# Patient Record
Sex: FEMALE | Race: BLACK OR AFRICAN AMERICAN | NOT HISPANIC OR LATINO | Employment: OTHER | URBAN - METROPOLITAN AREA
[De-identification: names, ages, dates, MRNs, and addresses within clinical notes are randomized per-mention and may not be internally consistent; named-entity substitution may affect disease eponyms.]

---

## 2022-04-07 ENCOUNTER — HISTORICAL (OUTPATIENT)
Dept: ADMINISTRATIVE | Facility: HOSPITAL | Age: 77
End: 2022-04-07
Payer: MEDICARE

## 2022-04-24 VITALS
BODY MASS INDEX: 25.58 KG/M2 | WEIGHT: 163 LBS | SYSTOLIC BLOOD PRESSURE: 130 MMHG | HEIGHT: 67 IN | DIASTOLIC BLOOD PRESSURE: 80 MMHG

## 2022-05-05 RX ORDER — CLOPIDOGREL BISULFATE 75 MG/1
1 TABLET ORAL DAILY
COMMUNITY
Start: 2022-02-18 | End: 2022-05-05 | Stop reason: SDUPTHER

## 2022-05-05 RX ORDER — CLOPIDOGREL BISULFATE 75 MG/1
75 TABLET ORAL DAILY
Qty: 90 TABLET | Refills: 2 | Status: SHIPPED | OUTPATIENT
Start: 2022-05-05 | End: 2022-05-06 | Stop reason: SDUPTHER

## 2022-05-06 DIAGNOSIS — Z86.73 HISTORY OF STROKE: Primary | ICD-10-CM

## 2022-05-06 RX ORDER — CLOPIDOGREL BISULFATE 75 MG/1
75 TABLET ORAL DAILY
Qty: 90 TABLET | Refills: 2 | Status: SHIPPED | OUTPATIENT
Start: 2022-05-06 | End: 2022-12-21

## 2022-05-23 RX ORDER — HUMAN INSULIN 100 [USP'U]/ML
INJECTION, SUSPENSION SUBCUTANEOUS
COMMUNITY
Start: 2021-12-07

## 2022-05-23 RX ORDER — BETAMETHASONE DIPROPIONATE 0.5 MG/G
CREAM TOPICAL
COMMUNITY
Start: 2022-02-24

## 2022-05-23 RX ORDER — METOPROLOL SUCCINATE 50 MG/1
TABLET, EXTENDED RELEASE ORAL
COMMUNITY
Start: 2022-05-04

## 2022-05-23 RX ORDER — LAMOTRIGINE 150 MG/1
1 TABLET ORAL DAILY
COMMUNITY
Start: 2022-05-03 | End: 2022-10-26 | Stop reason: SDUPTHER

## 2022-05-23 RX ORDER — CLOBETASOL PROPIONATE 0.5 MG/G
CREAM TOPICAL
COMMUNITY
Start: 2021-11-30

## 2022-05-23 RX ORDER — ZOLPIDEM TARTRATE 5 MG/1
TABLET ORAL
COMMUNITY
Start: 2022-05-04

## 2022-05-23 RX ORDER — ATORVASTATIN CALCIUM 10 MG/1
TABLET, FILM COATED ORAL
COMMUNITY
Start: 2022-04-09

## 2022-05-23 RX ORDER — METOCLOPRAMIDE 5 MG/1
TABLET ORAL
COMMUNITY
Start: 2022-05-03

## 2022-05-23 RX ORDER — SERTRALINE HYDROCHLORIDE 25 MG/1
TABLET, FILM COATED ORAL
COMMUNITY
Start: 2022-02-25

## 2022-05-23 RX ORDER — RAMIPRIL 10 MG/1
CAPSULE ORAL
COMMUNITY
Start: 2022-02-18

## 2022-05-23 RX ORDER — FUROSEMIDE 20 MG/1
TABLET ORAL
COMMUNITY
Start: 2022-05-03

## 2022-05-24 ENCOUNTER — OFFICE VISIT (OUTPATIENT)
Dept: NEUROLOGY | Facility: CLINIC | Age: 77
End: 2022-05-24
Payer: MEDICARE

## 2022-05-24 VITALS
WEIGHT: 164 LBS | HEIGHT: 67 IN | BODY MASS INDEX: 25.74 KG/M2 | DIASTOLIC BLOOD PRESSURE: 66 MMHG | SYSTOLIC BLOOD PRESSURE: 120 MMHG

## 2022-05-24 DIAGNOSIS — G47.33 OSA ON CPAP: Primary | ICD-10-CM

## 2022-05-24 DIAGNOSIS — G45.9 TIA (TRANSIENT ISCHEMIC ATTACK): ICD-10-CM

## 2022-05-24 DIAGNOSIS — G40.209 PARTIAL SYMPTOMATIC EPILEPSY WITH COMPLEX PARTIAL SEIZURES, NOT INTRACTABLE, WITHOUT STATUS EPILEPTICUS: ICD-10-CM

## 2022-05-24 PROBLEM — Z86.73 H/O: STROKE: Status: ACTIVE | Noted: 2022-05-24

## 2022-05-24 PROBLEM — Z86.73 H/O: STROKE: Status: RESOLVED | Noted: 2022-05-24 | Resolved: 2022-05-24

## 2022-05-24 PROBLEM — G40.909 NONINTRACTABLE EPILEPSY WITHOUT STATUS EPILEPTICUS: Status: ACTIVE | Noted: 2022-05-24

## 2022-05-24 PROCEDURE — 99213 OFFICE O/P EST LOW 20 MIN: CPT | Mod: S$GLB,,, | Performed by: NURSE PRACTITIONER

## 2022-05-24 PROCEDURE — 3078F PR MOST RECENT DIASTOLIC BLOOD PRESSURE < 80 MM HG: ICD-10-PCS | Mod: CPTII,S$GLB,, | Performed by: NURSE PRACTITIONER

## 2022-05-24 PROCEDURE — 99213 PR OFFICE/OUTPT VISIT, EST, LEVL III, 20-29 MIN: ICD-10-PCS | Mod: S$GLB,,, | Performed by: NURSE PRACTITIONER

## 2022-05-24 PROCEDURE — 3288F FALL RISK ASSESSMENT DOCD: CPT | Mod: CPTII,S$GLB,, | Performed by: NURSE PRACTITIONER

## 2022-05-24 PROCEDURE — 99999 PR PBB SHADOW E&M-EST. PATIENT-LVL III: ICD-10-PCS | Mod: PBBFAC,,, | Performed by: NURSE PRACTITIONER

## 2022-05-24 PROCEDURE — 1101F PR PT FALLS ASSESS DOC 0-1 FALLS W/OUT INJ PAST YR: ICD-10-PCS | Mod: CPTII,S$GLB,, | Performed by: NURSE PRACTITIONER

## 2022-05-24 PROCEDURE — 3078F DIAST BP <80 MM HG: CPT | Mod: CPTII,S$GLB,, | Performed by: NURSE PRACTITIONER

## 2022-05-24 PROCEDURE — 1101F PT FALLS ASSESS-DOCD LE1/YR: CPT | Mod: CPTII,S$GLB,, | Performed by: NURSE PRACTITIONER

## 2022-05-24 PROCEDURE — 3288F PR FALLS RISK ASSESSMENT DOCUMENTED: ICD-10-PCS | Mod: CPTII,S$GLB,, | Performed by: NURSE PRACTITIONER

## 2022-05-24 PROCEDURE — 1160F PR REVIEW ALL MEDS BY PRESCRIBER/CLIN PHARMACIST DOCUMENTED: ICD-10-PCS | Mod: CPTII,S$GLB,, | Performed by: NURSE PRACTITIONER

## 2022-05-24 PROCEDURE — 1159F MED LIST DOCD IN RCRD: CPT | Mod: CPTII,S$GLB,, | Performed by: NURSE PRACTITIONER

## 2022-05-24 PROCEDURE — 3074F PR MOST RECENT SYSTOLIC BLOOD PRESSURE < 130 MM HG: ICD-10-PCS | Mod: CPTII,S$GLB,, | Performed by: NURSE PRACTITIONER

## 2022-05-24 PROCEDURE — 3074F SYST BP LT 130 MM HG: CPT | Mod: CPTII,S$GLB,, | Performed by: NURSE PRACTITIONER

## 2022-05-24 PROCEDURE — 99999 PR PBB SHADOW E&M-EST. PATIENT-LVL III: CPT | Mod: PBBFAC,,, | Performed by: NURSE PRACTITIONER

## 2022-05-24 PROCEDURE — 1160F RVW MEDS BY RX/DR IN RCRD: CPT | Mod: CPTII,S$GLB,, | Performed by: NURSE PRACTITIONER

## 2022-05-24 PROCEDURE — 1159F PR MEDICATION LIST DOCUMENTED IN MEDICAL RECORD: ICD-10-PCS | Mod: CPTII,S$GLB,, | Performed by: NURSE PRACTITIONER

## 2022-05-24 RX ORDER — AMOXICILLIN 500 MG
1 CAPSULE ORAL
COMMUNITY

## 2022-05-24 RX ORDER — CHOLECALCIFEROL (VITAMIN D3) 25 MCG
1000 TABLET ORAL
COMMUNITY

## 2022-05-24 RX ORDER — GABAPENTIN 300 MG/1
300 CAPSULE ORAL NIGHTLY
COMMUNITY

## 2022-05-24 NOTE — PROGRESS NOTES
Subjective:       Patient ID: Sheila West is a 77 y.o. female.    Chief Complaint: 1 yr francisco j f/u/sz/CVA    HPI:            Pt reports nightly usage of pap machine; pap therapy beneficial for sleep. Refreshed awakenings, occ eds. Tolerating mask/pressure well. Needs supply order; dme is [facility].    PAP data:  Date range: 4/23/22-5/22/22  % of usage >= 4 hrs: 50% (15/30)  AHI: 0.7    EPWORTH SLEEPINESS SCALE 5/24/2022   Sitting and reading 1   Watching TV 2   Sitting, inactive in a public place (e.g. a theatre or a meeting) 0   As a passenger in a car for an hour without a break 0   Lying down to rest in the afternoon when circumstances permit 3   Sitting and talking to someone 0   Sitting quietly after a lunch without alcohol 0   In a car, while stopped for a few minutes in traffic 0   Total score 6     Denies sz   mg qhs    TIA  Takes Plavix 75 mg daily, atorvastatin, antihypertensives  Denies CVA symptoms    Labs:  LDL was 99  A1C 7.1  CMP was ok       Review of Systems   Constitutional: Negative for fatigue.   HENT: Positive for nasal congestion and sinus pressure/congestion. Negative for trouble swallowing.    Musculoskeletal: Positive for back pain and gait problem.   Neurological: Negative for seizures, facial asymmetry, speech difficulty, weakness, disturbances in coordination and coordination difficulties.   Psychiatric/Behavioral: Negative for sleep disturbance.             Social History     Socioeconomic History    Marital status:    Tobacco Use    Smoking status: Former Smoker    Smokeless tobacco: Never Used   Substance and Sexual Activity    Alcohol use: Never    Drug use: Never         Current Outpatient Medications:     atorvastatin (LIPITOR) 10 MG tablet, , Disp: , Rfl:     betamethasone dipropionate 0.05 % cream, , Disp: , Rfl:     clobetasoL (TEMOVATE) 0.05 % cream, , Disp: , Rfl:     clopidogreL (PLAVIX) 75 mg tablet, Take 1 tablet (75 mg total) by mouth once daily at  "6am., Disp: 90 tablet, Rfl: 2    furosemide (LASIX) 20 MG tablet, , Disp: , Rfl:     lamoTRIgine (LAMICTAL) 150 MG Tab, Take 1 tablet by mouth once daily at 6am., Disp: , Rfl:     metoclopramide HCl (REGLAN) 5 MG tablet, , Disp: , Rfl:     metoprolol succinate (TOPROL-XL) 50 MG 24 hr tablet, , Disp: , Rfl:     NOVOLIN 70/30 U-100 INSULIN 100 unit/mL (70-30) injection, , Disp: , Rfl:     omega-3 fatty acids/fish oil (FISH OIL-OMEGA-3 FATTY ACIDS) 300-1,000 mg capsule, Take 1 g by mouth., Disp: , Rfl:     ramipriL (ALTACE) 10 MG capsule, , Disp: , Rfl:     sertraline (ZOLOFT) 25 MG tablet, , Disp: , Rfl:     vitamin D (VITAMIN D3) 1000 units Tab, Take 1,000 Units by mouth., Disp: , Rfl:     zolpidem (AMBIEN) 5 MG Tab, , Disp: , Rfl:     gabapentin (NEURONTIN) 300 MG capsule, Take 300 mg by mouth every evening., Disp: , Rfl:      Objective:        Exam:   /66 (BP Location: Left arm, Patient Position: Sitting)   Ht 5' 7" (1.702 m)   Wt 74.4 kg (164 lb)   BMI 25.69 kg/m²     Physical Exam  Vitals reviewed.   Constitutional:       Appearance: Normal appearance.      Comments: Accompanied by    HENT:      Ears:      Comments: Hearing normal   Eyes:      Extraocular Movements: Extraocular movements intact.   Cardiovascular:      Rate and Rhythm: Normal rate and regular rhythm.   Pulmonary:      Effort: Pulmonary effort is normal.      Breath sounds: Normal breath sounds.   Musculoskeletal:         General: Normal range of motion.   Skin:     General: Skin is warm and dry.   Neurological:      General: No focal deficit present.      Mental Status: She is alert and oriented to person, place, and time.      Motor: No tremor.      Coordination: Coordination is intact.      Gait: Gait abnormal.      Comments:   Limp 2.2 one leg shorter than other; cane but did not use it; turn en bloc   Psychiatric:         Mood and Affect: Mood normal.         Behavior: Behavior normal.                 "   Assessment/Plan:       Problem List Items Addressed This Visit        Neuro    Nonintractable epilepsy without status epilepticus    Current Assessment & Plan     Continue the  mg at bed time           TIA (transient ischemic attack)    Current Assessment & Plan     Continue 2' stroke prevention measures                Other    IFEANYI on CPAP - Primary    Current Assessment & Plan     Encouraged continued use of PAP. Drowsy driving may still occur despite PAP use. Clinical follow up and replacement of supplies discussed.    Strongly encouraged her to utilize PAP for 4 hours or more > 4 hrs 70% of nights. Ask that she reach out to PCP for further guidance in regards to her chr sinusitis in hopes that treatment may help to make PAP therapy more tolerable.     DME:[facility] in 1 year                            Vish Moran, MSN, APRN, AGACNP-BC

## 2022-05-24 NOTE — ASSESSMENT & PLAN NOTE
Encouraged continued use of PAP. Drowsy driving may still occur despite PAP use. Clinical follow up and replacement of supplies discussed.    Strongly encouraged her to utilize PAP for 4 hours or more > 4 hrs 70% of nights. Ask that she reach out to PCP for further guidance in regards to her chr sinusitis in hopes that treatment may help to make PAP therapy more tolerable.     DME:[facility] in 1 year

## 2022-08-03 ENCOUNTER — TELEPHONE (OUTPATIENT)
Dept: NEUROLOGY | Facility: CLINIC | Age: 77
End: 2022-08-03
Payer: MEDICARE

## 2022-08-03 NOTE — TELEPHONE ENCOUNTER
Pt called that she is having a procedure with Dr. Garvey on 8/16/22    She is on plavix and asking how long she should be off prior to sx  ..  (need to call pt, and also need to fax info to Dr. Garvey)

## 2022-10-26 DIAGNOSIS — G40.209 PARTIAL SYMPTOMATIC EPILEPSY WITH COMPLEX PARTIAL SEIZURES, NOT INTRACTABLE, WITHOUT STATUS EPILEPTICUS: Primary | ICD-10-CM

## 2022-10-26 RX ORDER — LAMOTRIGINE 150 MG/1
150 TABLET ORAL DAILY
Qty: 90 TABLET | Refills: 3 | Status: SHIPPED | OUTPATIENT
Start: 2022-10-26 | End: 2023-05-30 | Stop reason: SDUPTHER

## 2022-12-21 DIAGNOSIS — Z86.73 HISTORY OF STROKE: ICD-10-CM

## 2022-12-21 RX ORDER — CLOPIDOGREL BISULFATE 75 MG/1
TABLET ORAL
Qty: 90 TABLET | Refills: 2 | Status: SHIPPED | OUTPATIENT
Start: 2022-12-21 | End: 2024-01-16

## 2023-05-30 ENCOUNTER — OFFICE VISIT (OUTPATIENT)
Dept: NEUROLOGY | Facility: CLINIC | Age: 78
End: 2023-05-30
Payer: MEDICARE

## 2023-05-30 VITALS
WEIGHT: 164 LBS | DIASTOLIC BLOOD PRESSURE: 76 MMHG | BODY MASS INDEX: 25.74 KG/M2 | HEIGHT: 67 IN | SYSTOLIC BLOOD PRESSURE: 140 MMHG

## 2023-05-30 DIAGNOSIS — G40.209 PARTIAL SYMPTOMATIC EPILEPSY WITH COMPLEX PARTIAL SEIZURES, NOT INTRACTABLE, WITHOUT STATUS EPILEPTICUS: ICD-10-CM

## 2023-05-30 DIAGNOSIS — G47.33 OSA ON CPAP: Primary | ICD-10-CM

## 2023-05-30 DIAGNOSIS — R42 DIZZINESS: ICD-10-CM

## 2023-05-30 PROCEDURE — 3078F PR MOST RECENT DIASTOLIC BLOOD PRESSURE < 80 MM HG: ICD-10-PCS | Mod: CPTII,S$GLB,, | Performed by: NURSE PRACTITIONER

## 2023-05-30 PROCEDURE — 1160F RVW MEDS BY RX/DR IN RCRD: CPT | Mod: CPTII,S$GLB,, | Performed by: NURSE PRACTITIONER

## 2023-05-30 PROCEDURE — 3078F DIAST BP <80 MM HG: CPT | Mod: CPTII,S$GLB,, | Performed by: NURSE PRACTITIONER

## 2023-05-30 PROCEDURE — 99999 PR PBB SHADOW E&M-EST. PATIENT-LVL III: CPT | Mod: PBBFAC,,, | Performed by: NURSE PRACTITIONER

## 2023-05-30 PROCEDURE — 99999 PR PBB SHADOW E&M-EST. PATIENT-LVL III: ICD-10-PCS | Mod: PBBFAC,,, | Performed by: NURSE PRACTITIONER

## 2023-05-30 PROCEDURE — 3077F PR MOST RECENT SYSTOLIC BLOOD PRESSURE >= 140 MM HG: ICD-10-PCS | Mod: CPTII,S$GLB,, | Performed by: NURSE PRACTITIONER

## 2023-05-30 PROCEDURE — 1160F PR REVIEW ALL MEDS BY PRESCRIBER/CLIN PHARMACIST DOCUMENTED: ICD-10-PCS | Mod: CPTII,S$GLB,, | Performed by: NURSE PRACTITIONER

## 2023-05-30 PROCEDURE — 99214 PR OFFICE/OUTPT VISIT, EST, LEVL IV, 30-39 MIN: ICD-10-PCS | Mod: S$GLB,,, | Performed by: NURSE PRACTITIONER

## 2023-05-30 PROCEDURE — 3077F SYST BP >= 140 MM HG: CPT | Mod: CPTII,S$GLB,, | Performed by: NURSE PRACTITIONER

## 2023-05-30 PROCEDURE — 99214 OFFICE O/P EST MOD 30 MIN: CPT | Mod: S$GLB,,, | Performed by: NURSE PRACTITIONER

## 2023-05-30 PROCEDURE — 1159F MED LIST DOCD IN RCRD: CPT | Mod: CPTII,S$GLB,, | Performed by: NURSE PRACTITIONER

## 2023-05-30 PROCEDURE — 1159F PR MEDICATION LIST DOCUMENTED IN MEDICAL RECORD: ICD-10-PCS | Mod: CPTII,S$GLB,, | Performed by: NURSE PRACTITIONER

## 2023-05-30 RX ORDER — PANTOPRAZOLE SODIUM 40 MG/1
TABLET, DELAYED RELEASE ORAL
COMMUNITY
Start: 2023-04-20

## 2023-05-30 RX ORDER — LAMOTRIGINE 150 MG/1
150 TABLET ORAL DAILY
Qty: 90 TABLET | Refills: 3 | Status: SHIPPED | OUTPATIENT
Start: 2023-05-30

## 2023-05-30 RX ORDER — LATANOPROST 50 UG/ML
SOLUTION/ DROPS OPHTHALMIC
COMMUNITY
Start: 2023-02-21

## 2023-05-30 NOTE — PROGRESS NOTES
"  Neurology Follow up Note    Subjective:         Patient ID: Sheila West is a 78 y.o. female.    Chief Complaint: follow up for IFEANYI and sz    HPI:            IFEANYI:  Staes she has been wearing the CPAP abt 3-4 times per night due being unable to breathe. Sleeps better w CPAP when she is felling good. Sleeps 5-6 hrs a nigjht and awakens 1 time due to nocturia and is able to go right back to sleep.  Awakens refreshed and occs has EDS. Does not nap. Changes mask and tubing on a regular basis. Denies any issues w the mask or the equipment. DME-[facility].     PAP data:  date range 04/29/23-05/28/23  days used >=4hr 16/30 53%  AHI 0.8  Autopap 5-20 cm     Sz:  Remains sz free   mg at bed time    she is having an Angioplasty done on 06/23/2023 per Dr. Horvath    She has been struggling with dizziness since "1980's". She has been to PT numerous times. She recently visited with Dr. Garvey [neurosurgery] - per patient, was told her "balance problems are 2.2 her bad back"        EPWORTH SLEEPINESS SCALE 5/24/2022   Sitting and reading 1   Watching TV 2   Sitting, inactive in a public place (e.g. a theatre or a meeting) 0   As a passenger in a car for an hour without a break 0   Lying down to rest in the afternoon when circumstances permit 3   Sitting and talking to someone 0   Sitting quietly after a lunch without alcohol 0   In a car, while stopped for a few minutes in traffic 0   Total score 6        ROS: as per HPI, otherwise pertinent systems review is negative          Past Medical History:   Diagnosis Date    Diabetes mellitus     IFEANYI (obstructive sleep apnea)     Stroke        Past Surgical History:   Procedure Laterality Date    BACK SURGERY      CHOLECYSTECTOMY      HEMORRHOID SURGERY      HYSTERECTOMY         History reviewed. No pertinent family history.    Social History     Socioeconomic History    Marital status:    Tobacco Use    Smoking status: Former    Smokeless tobacco: Never   Substance and " "Sexual Activity    Alcohol use: Never    Drug use: Never       Review of patient's allergies indicates:  No Known Allergies    Current Outpatient Medications   Medication Instructions    atorvastatin (LIPITOR) 10 MG tablet No dose, route, or frequency recorded.    betamethasone dipropionate 0.05 % cream No dose, route, or frequency recorded.    clobetasoL (TEMOVATE) 0.05 % cream No dose, route, or frequency recorded.    clopidogreL (PLAVIX) 75 mg tablet TAKE 1 TABLET ONE TIME DAILY AT 6:00 AM    furosemide (LASIX) 20 MG tablet No dose, route, or frequency recorded.    gabapentin (NEURONTIN) 300 mg, Oral, Nightly    lamoTRIgine (LAMICTAL) 150 mg, Oral, Daily    latanoprost 0.005 % ophthalmic solution No dose, route, or frequency recorded.    metoclopramide HCl (REGLAN) 5 MG tablet No dose, route, or frequency recorded.    metoprolol succinate (TOPROL-XL) 50 MG 24 hr tablet 1 1/2 tab qd    NON FORMULARY MEDICATION [place]    NOVOLIN 70/30 U-100 INSULIN 100 unit/mL (70-30) injection No dose, route, or frequency recorded.    omega-3 fatty acids/fish oil (FISH OIL-OMEGA-3 FATTY ACIDS) 300-1,000 mg capsule 1 g, Oral    pantoprazole (PROTONIX) 40 MG tablet Oral    ramipriL (ALTACE) 10 MG capsule No dose, route, or frequency recorded.    sertraline (ZOLOFT) 25 MG tablet No dose, route, or frequency recorded.    vitamin D (VITAMIN D3) 1,000 Units, Oral    zolpidem (AMBIEN) 5 MG Tab PRN       Objective:      Exam:   Visit Vitals  BP (!) 140/76   Ht 5' 7" (1.702 m)   Wt 74.4 kg (164 lb)   BMI 25.69 kg/m²       Physical Exam  Vitals reviewed.   Constitutional:       Appearance: Normal appearance.      Accompanied by:   HENT:      Ears:      Comments: Hearing normal.  Eyes:      Extraocular Movements: Extraocular movements intact.      VF's nml     PERRLA  Cardiovascular:      Rate and Rhythm: Normal rate and regular rhythm.   Pulmonary:      Effort: Pulmonary effort is normal.      Breath sounds: Normal breath " sounds.   Musculoskeletal:         General: Normal range of motion.   Skin:     General: Skin is warm and dry.   Neurological:      General: No focal deficit present.      Mental Status: she is alert and oriented to person, place, and time.      Speech: nml     Face: symmetric     Motor: nonlateralizing      Coordination: F to N ok      Tone: no ankle clonus     Gait: rollator; limp [R leg shorted than L]; cautious turns  Psychiatric:         Mood and Affect: Mood normal.         Behavior: Behavior normal.         Assessment/Plan:   1. IFEANYI on CPAP  Patient is benefiting from PAP therapy; Encouraged continued use of PAP. Drowsy driving may still occur despite PAP use. Clinical follow up and replacement of supplies discussed.    Strongly advise she increase compliance with PAP >=4hr for 70% of nights    DME:[facility]    2. Partial symptomatic epilepsy with complex partial seizures, not intractable, without status epilepticus  Continue the Lamictal 150 mg at bed time     Check LTG trough level [c/o dizziness]; please draw lab in the afternoon, not in the morning     Risks of recurrent seizure discussed. seizure precautions discussed. Pt aware that unlawful to drive in [place] until seizure free at least 6 months.    Do not swim in pool alone  Do not bathe in tub full of water; shower preferred  Avoid medications such as Tramadol, Wellbutrin, Cipro, Levaquin  Avoid ladders/heights  Avoid binge drinking  Avoid sleep deprivation     She does not drive     No follow-ups on file.      Vish Moran, MSN, APRN, AGACNP-BC

## 2024-01-16 DIAGNOSIS — Z86.73 HISTORY OF STROKE: ICD-10-CM

## 2024-01-16 RX ORDER — CLOPIDOGREL BISULFATE 75 MG/1
TABLET ORAL
Qty: 90 TABLET | Refills: 2 | Status: SHIPPED | OUTPATIENT
Start: 2024-01-16

## 2024-04-17 DIAGNOSIS — G40.209 PARTIAL SYMPTOMATIC EPILEPSY WITH COMPLEX PARTIAL SEIZURES, NOT INTRACTABLE, WITHOUT STATUS EPILEPTICUS: ICD-10-CM

## 2024-04-17 RX ORDER — LAMOTRIGINE 150 MG/1
150 TABLET ORAL
Qty: 90 TABLET | Refills: 1 | Status: SHIPPED | OUTPATIENT
Start: 2024-04-17 | End: 2024-06-04 | Stop reason: SDUPTHER

## 2024-06-04 ENCOUNTER — OFFICE VISIT (OUTPATIENT)
Dept: NEUROLOGY | Facility: CLINIC | Age: 79
End: 2024-06-04
Payer: MEDICARE

## 2024-06-04 ENCOUNTER — TELEPHONE (OUTPATIENT)
Dept: NEUROLOGY | Facility: CLINIC | Age: 79
End: 2024-06-04

## 2024-06-04 VITALS — WEIGHT: 164 LBS | HEIGHT: 67 IN | BODY MASS INDEX: 25.74 KG/M2

## 2024-06-04 DIAGNOSIS — G40.209 PARTIAL SYMPTOMATIC EPILEPSY WITH COMPLEX PARTIAL SEIZURES, NOT INTRACTABLE, WITHOUT STATUS EPILEPTICUS: ICD-10-CM

## 2024-06-04 DIAGNOSIS — G47.33 OSA ON CPAP: Primary | ICD-10-CM

## 2024-06-04 PROCEDURE — 99214 OFFICE O/P EST MOD 30 MIN: CPT | Mod: S$GLB,,, | Performed by: NURSE PRACTITIONER

## 2024-06-04 PROCEDURE — 3288F FALL RISK ASSESSMENT DOCD: CPT | Mod: CPTII,S$GLB,, | Performed by: NURSE PRACTITIONER

## 2024-06-04 PROCEDURE — 1160F RVW MEDS BY RX/DR IN RCRD: CPT | Mod: CPTII,S$GLB,, | Performed by: NURSE PRACTITIONER

## 2024-06-04 PROCEDURE — 99999 PR PBB SHADOW E&M-EST. PATIENT-LVL III: CPT | Mod: PBBFAC,,, | Performed by: NURSE PRACTITIONER

## 2024-06-04 PROCEDURE — 1159F MED LIST DOCD IN RCRD: CPT | Mod: CPTII,S$GLB,, | Performed by: NURSE PRACTITIONER

## 2024-06-04 PROCEDURE — 1101F PT FALLS ASSESS-DOCD LE1/YR: CPT | Mod: CPTII,S$GLB,, | Performed by: NURSE PRACTITIONER

## 2024-06-04 RX ORDER — RANOLAZINE 500 MG/1
500 TABLET, EXTENDED RELEASE ORAL 2 TIMES DAILY
COMMUNITY
Start: 2024-05-01

## 2024-06-04 RX ORDER — MULTIVITAMIN
1 TABLET ORAL EVERY MORNING
COMMUNITY

## 2024-06-04 RX ORDER — LAMOTRIGINE 150 MG/1
150 TABLET ORAL DAILY
Qty: 90 TABLET | Refills: 3 | Status: SHIPPED | OUTPATIENT
Start: 2024-06-04

## 2024-06-04 RX ORDER — LOSARTAN POTASSIUM 50 MG/1
50 TABLET ORAL DAILY
COMMUNITY
Start: 2024-05-23

## 2024-06-04 NOTE — PROGRESS NOTES
Neurology Follow up Note    Subjective:         Patient ID: Sheila West is a 79 y.o. female.    Chief Complaint: 1 year IFEANYI/sz    HPI:            Pt reports nightly usage of pap machine; pap therapy beneficial for sleep. Refreshed awakenings, denies EDS. Tolerating mask/pressure well but feels she is in need of more moisture while using pap. Needs supply order    DME is [facility].  PAP data:   Date range: 5/4/24-6/2/24  % of usage >= 4 hrs: 80%  AHI: 0.2  Autopap 5-20    Remains sz free  2023 LTG level 6   mg at bed time         6/4/2024    10:46 AM   EPWORTH SLEEPINESS SCALE   Sitting and reading 2   Watching TV 0   Sitting, inactive in a public place (e.g. a theatre or a meeting) 0   As a passenger in a car for an hour without a break 1   Lying down to rest in the afternoon when circumstances permit 0   Sitting and talking to someone 0   Sitting quietly after a lunch without alcohol 0   In a car, while stopped for a few minutes in traffic 0   Total score 3         ROS: as per HPI, otherwise pertinent systems review is negative          Past Medical History:   Diagnosis Date    Diabetes mellitus     IFEANYI (obstructive sleep apnea)     Stroke        Past Surgical History:   Procedure Laterality Date    BACK SURGERY      CHOLECYSTECTOMY      HEMORRHOID SURGERY      HYSTERECTOMY         No family history on file.    Social History     Socioeconomic History    Marital status:    Tobacco Use    Smoking status: Former    Smokeless tobacco: Never   Substance and Sexual Activity    Alcohol use: Never    Drug use: Never       Review of patient's allergies indicates:  No Known Allergies    Current Outpatient Medications   Medication Instructions    atorvastatin (LIPITOR) 10 MG tablet No dose, route, or frequency recorded.    betamethasone dipropionate 0.05 % cream No dose, route, or frequency recorded.    clobetasoL (TEMOVATE) 0.05 % cream No dose, route, or frequency recorded.    clopidogreL (PLAVIX) 75 mg  "tablet TAKE 1 TABLET ONE TIME DAILY AT 6:00 AM    furosemide (LASIX) 20 MG tablet No dose, route, or frequency recorded.    insulin NPH-insulin regular, 70/30, 100 unit/mL (70-30) injection 30 Units, Subcutaneous    lamoTRIgine (LAMICTAL) 150 mg, Oral    latanoprost 0.005 % ophthalmic solution No dose, route, or frequency recorded.    losartan (COZAAR) 50 mg, Oral, Daily    metoprolol succinate (TOPROL-XL) 50 MG 24 hr tablet 1 1/2 tab qd    multivitamin with folic acid 400 mcg Tab 1 tablet, Oral, Every morning    NOVOLIN 70/30 U-100 INSULIN 100 unit/mL (70-30) injection No dose, route, or frequency recorded.    omega-3 fatty acids/fish oil (FISH OIL-OMEGA-3 FATTY ACIDS) 300-1,000 mg capsule 1 g, Oral    pantoprazole (PROTONIX) 40 MG tablet Oral    ranolazine (RANEXA) 500 mg, Oral, 2 times daily    sertraline (ZOLOFT) 25 MG tablet No dose, route, or frequency recorded.    vitamin D (VITAMIN D3) 1,000 Units, Oral    zolpidem (AMBIEN) 5 MG Tab PRN       Objective:      Exam:   Visit Vitals  Ht 5' 7" (1.702 m)   Wt 74.4 kg (164 lb 0.4 oz)   BMI 25.69 kg/m²       Physical Exam  Vitals reviewed.   Constitutional:       Appearance: Normal appearance.      Accompanied by:    HENT:      Ears:      Comments: Hearing normal.  Eyes:      Extraocular Movements: Extraocular movements intact.      VF's ok  Cardiovascular:      Rate and Rhythm: Normal rate and regular rhythm.   Pulmonary:      Effort: Pulmonary effort is normal.      Breath sounds: Normal breath sounds.   Musculoskeletal:         General: Normal range of motion.   Skin:     General: Skin is warm and dry.   Neurological:      General: No focal deficit present.      Mental Status: alert and oriented to person, place, and time.      Speech: nml     Face: symmetric     Motor: nonlateralizing     Coordination: F to N ok, no dysmetria     Tremor: none     Gait: cane  Psychiatric:         Mood and Affect: Mood normal.         Behavior: Behavior normal.       "   Assessment/Plan:   1. Partial symptomatic epilepsy with complex partial seizures, not intractable, without status epilepticus  Continue the  mg at bed time     Risks of recurrent seizure discussed. seizure precautions discussed. Pt aware that unlawful to drive in [place] until seizure free at least 6 months.    Do not swim in pool alone  Do not bathe in tub full of water; shower preferred  Avoid medications such as Tramadol, Wellbutrin, Cipro, Levaquin  Avoid ladders/heights  Avoid binge drinking  Avoid sleep deprivation [takes Ambien at bed time PRN - rx from PCP]      2. IFEANYI on CPAP  Patient is benefiting from PAP therapy; Encouraged continued use of PAP. Drowsy driving may still occur despite PAP use. Clinical follow up and replacement of supplies discussed.    Increase the humidifier setting to max  Try Mouth Kote [Biotene] at bed time     DME:[facility] in 1 year     Follow up in about 1 year (around 6/4/2025), or if symptoms worsen or fail to improve, for IFEANYI.      Vish Moran, MSN, APRN, AGACNP-BC

## 2024-06-04 NOTE — TELEPHONE ENCOUNTER
[facility]oma contacted office regarding pap supply rx faxed to them; they are out of network w patients ins. They are forwarding order to [facility] for her

## 2024-07-23 ENCOUNTER — TELEPHONE (OUTPATIENT)
Dept: NEUROLOGY | Facility: CLINIC | Age: 79
End: 2024-07-23
Payer: MEDICARE

## 2024-07-23 NOTE — TELEPHONE ENCOUNTER
The pt called the clinic stating that the pharmacy that the cpap orders were sent did not take her insurance. She did call around and found [facility] in [place], [place] (Phone: [phone], Fax: [phone]). I will send office visit note and order to [facility].

## 2024-12-31 DIAGNOSIS — Z86.73 HISTORY OF STROKE: Primary | ICD-10-CM

## 2024-12-31 RX ORDER — CLOPIDOGREL BISULFATE 75 MG/1
TABLET ORAL
Qty: 90 TABLET | Refills: 3 | Status: SHIPPED | OUTPATIENT
Start: 2024-12-31

## 2025-04-12 DIAGNOSIS — G40.209 PARTIAL SYMPTOMATIC EPILEPSY WITH COMPLEX PARTIAL SEIZURES, NOT INTRACTABLE, WITHOUT STATUS EPILEPTICUS: ICD-10-CM

## 2025-04-14 RX ORDER — LAMOTRIGINE 150 MG/1
150 TABLET ORAL
Qty: 90 TABLET | Refills: 3 | Status: SHIPPED | OUTPATIENT
Start: 2025-04-14

## 2025-06-04 ENCOUNTER — PATIENT MESSAGE (OUTPATIENT)
Dept: NEUROLOGY | Facility: CLINIC | Age: 80
End: 2025-06-04
Payer: MEDICARE

## 2025-06-09 ENCOUNTER — OFFICE VISIT (OUTPATIENT)
Dept: NEUROLOGY | Facility: CLINIC | Age: 80
End: 2025-06-09
Payer: MEDICARE

## 2025-06-09 VITALS
DIASTOLIC BLOOD PRESSURE: 84 MMHG | HEIGHT: 67 IN | WEIGHT: 147 LBS | BODY MASS INDEX: 23.07 KG/M2 | SYSTOLIC BLOOD PRESSURE: 119 MMHG

## 2025-06-09 DIAGNOSIS — G47.33 OSA ON CPAP: Primary | ICD-10-CM

## 2025-06-09 DIAGNOSIS — G40.209 PARTIAL SYMPTOMATIC EPILEPSY WITH COMPLEX PARTIAL SEIZURES, NOT INTRACTABLE, WITHOUT STATUS EPILEPTICUS: ICD-10-CM

## 2025-06-09 PROCEDURE — 1101F PT FALLS ASSESS-DOCD LE1/YR: CPT | Mod: CPTII,S$GLB,, | Performed by: NURSE PRACTITIONER

## 2025-06-09 PROCEDURE — 1160F RVW MEDS BY RX/DR IN RCRD: CPT | Mod: CPTII,S$GLB,, | Performed by: NURSE PRACTITIONER

## 2025-06-09 PROCEDURE — 3079F DIAST BP 80-89 MM HG: CPT | Mod: CPTII,S$GLB,, | Performed by: NURSE PRACTITIONER

## 2025-06-09 PROCEDURE — 3288F FALL RISK ASSESSMENT DOCD: CPT | Mod: CPTII,S$GLB,, | Performed by: NURSE PRACTITIONER

## 2025-06-09 PROCEDURE — 1159F MED LIST DOCD IN RCRD: CPT | Mod: CPTII,S$GLB,, | Performed by: NURSE PRACTITIONER

## 2025-06-09 PROCEDURE — 99999 PR PBB SHADOW E&M-EST. PATIENT-LVL III: CPT | Mod: PBBFAC,,, | Performed by: NURSE PRACTITIONER

## 2025-06-09 PROCEDURE — 3074F SYST BP LT 130 MM HG: CPT | Mod: CPTII,S$GLB,, | Performed by: NURSE PRACTITIONER

## 2025-06-09 PROCEDURE — 99215 OFFICE O/P EST HI 40 MIN: CPT | Mod: S$GLB,,, | Performed by: NURSE PRACTITIONER

## 2025-06-09 NOTE — PROGRESS NOTES
Neurology Follow up Note    Subjective:         Patient ID: Sheila West is a 80 y.o. female.    Chief Complaint: annual FU for epilepsy and IFEANYI    HPI:            IFEANYI:  Wears CPAP qhs and tolerating it well. Sleeps 4-5 hr a night awakens refreshed. Patient reports diff falling or staying asleep.  Denies EDS. (Supplies needed)      DME-[facility]     PAP data:  date range 05/05/2025-06/03/2025  days used >=4hr 13/30  43%  AHI 2.1  Auto 5-20    5 months ago, dx with Pulmonary Fibrosis - now under the care of Dr. Mccurdy - utilizes supplemental O2 during the day and at times during the night. If she uses the supplemental oxygen during the night, she does not utilize her PAP and vice versa.    Epilepsy:  Remains sz free   mg at bed time   Last LTG level was 6 back in 2023    Denies TIA/CVA symptoms     Reports tremor when writing or holding utensils - subtle, not bothersome; does not interfere w/ADLs or QOL.        6/9/2025   EPWORTH SLEEPINESS SCALE   Sitting and reading 1   Watching TV 0   Sitting, inactive in a public place (e.g. a theatre or a meeting) 0   As a passenger in a car for an hour without a break 0   Lying down to rest in the afternoon when circumstances permit 1   Sitting and talking to someone 0   Sitting quietly after a lunch without alcohol 1   In a car, while stopped for a few minutes in traffic 0   Total score 3       ROS: as per HPI, otherwise pertinent systems review is negative          Past Medical History:   Diagnosis Date    Diabetes mellitus     IFEANYI (obstructive sleep apnea)     Stroke        Past Surgical History:   Procedure Laterality Date    BACK SURGERY      CHOLECYSTECTOMY      HEMORRHOID SURGERY      HYSTERECTOMY         No family history on file.    Social History     Socioeconomic History    Marital status:    Tobacco Use    Smoking status: Former    Smokeless tobacco: Never   Substance and Sexual Activity    Alcohol use: Never    Drug use: Never       Review of  "patient's allergies indicates:  No Known Allergies    Current Outpatient Medications   Medication Instructions    atorvastatin (LIPITOR) 10 MG tablet No dose, route, or frequency recorded.    betamethasone dipropionate 0.05 % cream No dose, route, or frequency recorded.    cilostazoL (PLETAL) 50 mg, 2 times daily    clobetasoL (TEMOVATE) 0.05 % cream No dose, route, or frequency recorded.    clopidogreL (PLAVIX) 75 mg tablet TAKE 1 TABLET ONE TIME DAILY AT 6:00 AM    furosemide (LASIX) 20 MG tablet No dose, route, or frequency recorded.    insulin NPH-insulin regular, 70/30, 100 unit/mL (70-30) injection 30 Units    lamoTRIgine (LAMICTAL) 150 mg, Oral    latanoprost 0.005 % ophthalmic solution No dose, route, or frequency recorded.    losartan (COZAAR) 50 mg, Daily    metoprolol succinate (TOPROL-XL) 50 MG 24 hr tablet 1 1/2 tab qd    multivitamin with folic acid 400 mcg Tab 1 tablet, Every morning    NOVOLIN 70/30 U-100 INSULIN 100 unit/mL (70-30) injection No dose, route, or frequency recorded.    omega-3 fatty acids/fish oil (FISH OIL-OMEGA-3 FATTY ACIDS) 300-1,000 mg capsule 1 g    pantoprazole (PROTONIX) 40 MG tablet Take by mouth.    ranolazine (RANEXA) 500 mg, 2 times daily    sertraline (ZOLOFT) 25 MG tablet No dose, route, or frequency recorded.    vitamin D (VITAMIN D3) 1,000 Units    zolpidem (AMBIEN) 5 MG Tab PRN       Objective:      Exam:   Visit Vitals  /84 (BP Location: Left arm, Patient Position: Sitting)   Ht 5' 7" (1.702 m)   Wt 66.7 kg (147 lb)   BMI 23.02 kg/m²       Physical Exam  Vitals reviewed.   Constitutional:       Appearance: Normal appearance. Supplemental O2 in progress; not      parkinsonian      Accompanied by:   HENT:      Ears: Hearing normal.  Eyes:      Extraocular Movements: Extraocular movements intact.      VF's ok  Cardiovascular:      Rate and Rhythm: Normal rate and regular rhythm.   Pulmonary:      Effort: Pulmonary effort is SOB with activity      Breath " sounds: Normal breath sounds. Coughed w/deep breath   Musculoskeletal:         General: Normal range of motion.   Skin:     General: Skin is warm and dry.   Neurological:      General: No focal deficit present.      Mental Status: alert and oriented to person, place, and time.      Speech: nml     Face: symmetric; tongue midline; cheek puff nml     Motor: today she is equal strength 5/5     Coordination: F to N ok, no dysmetria     Tone: equivocal B wrist cogwheel      Tremor: subtle action tremor B hands R>L with arms outstretched [R       hand dominant]     Gait: WC [uses when having to ambulate longer distances; otherwise      rollator ]  Psychiatric:         Mood and Affect: Mood normal.         Behavior: Behavior normal.         Assessment/Plan:   1. IFEANYI on CPAP (Primary)    Patient is benefiting from PAP therapy; Encouraged continued use of PAP. Drowsy driving may still occur despite PAP use. Clinical follow up and replacement of supplies discussed.    She is willing to do a OPO while wearing PAP to determine if PAP therapy alone is adequately managing any degree of nocturnal hypoxemia in the setting of her new dx of Pulm Fibrosis - she does not utilize PAP on nights she uses the O2 and vice versa.     I ask that increase the humidifier setting to help with dry mouth     DME:[facility] in 1 year       2. Partial symptomatic epilepsy with complex partial seizures, not intractable, without status epilepticus  Continue the  mg at bed time; will check LTG trough level; we discussed risk of qd dosing vs BID dosing.      Risks of recurrent seizure discussed. seizure precautions discussed. Pt aware that unlawful to drive in [place] until seizure free at least 6 months.     Do not swim in pool alone  Do not bathe in tub full of water; shower preferred  Avoid medications such as Tramadol, Wellbutrin, Cipro, Levaquin  Avoid ladders/heights  Avoid binge drinking  Avoid sleep deprivation [takes Ambien at bed time PRN  - rx from PCP]     Continue 2' stroke prevention measures    3. Tremor  Action tremor; not parkinsonian; likely represents ET    Not bothersome  She is taking a betablocker    Discussed potential SE to Reglan includes tremor or parkinsonism when taking for an extended amt of time.     Collectively decided to keep meds the same.   Considered imaging    Follow up in about 1 year (around 6/9/2026), or if symptoms worsen or fail to improve, for IFEANYI, Epilepsy.      Vish Moran, MSN, APRN, AGACNP-BC

## 2025-06-10 ENCOUNTER — TELEPHONE (OUTPATIENT)
Dept: NEUROLOGY | Facility: CLINIC | Age: 80
End: 2025-06-10
Payer: MEDICARE

## 2025-06-10 RX ORDER — CILOSTAZOL 100 MG/1
50 TABLET ORAL 2 TIMES DAILY
COMMUNITY

## 2025-07-11 ENCOUNTER — TELEPHONE (OUTPATIENT)
Dept: NEUROLOGY | Facility: CLINIC | Age: 80
End: 2025-07-11
Payer: MEDICARE

## 2025-07-11 NOTE — TELEPHONE ENCOUNTER
----- Message from RT Tamez sent at 7/11/2025 10:59 AM CDT -----  Regarding: MELVA  Order for the over night pulse ox has been sent/faxed to patient's current [facility]

## 2025-08-13 ENCOUNTER — TELEPHONE (OUTPATIENT)
Dept: NEUROLOGY | Facility: CLINIC | Age: 80
End: 2025-08-13
Payer: MEDICARE

## 2025-08-13 DIAGNOSIS — Z86.73 HISTORY OF STROKE: Primary | ICD-10-CM

## 2025-08-13 RX ORDER — CLOPIDOGREL BISULFATE 75 MG/1
75 TABLET ORAL DAILY
Qty: 90 TABLET | Refills: 3 | Status: SHIPPED | OUTPATIENT
Start: 2025-08-13